# Patient Record
Sex: FEMALE | Race: WHITE
[De-identification: names, ages, dates, MRNs, and addresses within clinical notes are randomized per-mention and may not be internally consistent; named-entity substitution may affect disease eponyms.]

---

## 2020-01-03 ENCOUNTER — HOSPITAL ENCOUNTER (OUTPATIENT)
Dept: HOSPITAL 54 - DS | Age: 60
Discharge: HOME | End: 2020-01-03
Attending: SPECIALIST
Payer: COMMERCIAL

## 2020-01-03 DIAGNOSIS — K21.9: ICD-10-CM

## 2020-01-03 DIAGNOSIS — Z98.890: ICD-10-CM

## 2020-01-03 DIAGNOSIS — Z79.899: ICD-10-CM

## 2020-01-03 DIAGNOSIS — M75.41: Primary | ICD-10-CM

## 2020-01-03 DIAGNOSIS — Z90.710: ICD-10-CM

## 2020-01-03 PROCEDURE — 36415 COLL VENOUS BLD VENIPUNCTURE: CPT

## 2020-01-03 PROCEDURE — 29827 SHO ARTHRS SRG RT8TR CUF RPR: CPT

## 2020-01-03 PROCEDURE — 88311 DECALCIFY TISSUE: CPT

## 2020-01-03 PROCEDURE — A4217 STERILE WATER/SALINE, 500 ML: HCPCS

## 2020-01-03 PROCEDURE — 86850 RBC ANTIBODY SCREEN: CPT

## 2020-01-03 PROCEDURE — A4565 SLINGS: HCPCS

## 2020-01-03 PROCEDURE — 88304 TISSUE EXAM BY PATHOLOGIST: CPT

## 2020-01-03 PROCEDURE — C1713 ANCHOR/SCREW BN/BN,TIS/BN: HCPCS

## 2020-01-03 PROCEDURE — 29824 SHO ARTHRS SRG DSTL CLAVICLC: CPT

## 2020-03-12 ENCOUNTER — OFFICE (OUTPATIENT)
Dept: URBAN - METROPOLITAN AREA CLINIC 33 | Facility: CLINIC | Age: 60
End: 2020-03-12

## 2020-03-12 VITALS
WEIGHT: 170 LBS | HEIGHT: 63 IN | HEART RATE: 92 BPM | DIASTOLIC BLOOD PRESSURE: 80 MMHG | SYSTOLIC BLOOD PRESSURE: 93 MMHG

## 2020-03-12 DIAGNOSIS — R14.2 BELCHING SYMPTOM: ICD-10-CM

## 2020-03-12 PROCEDURE — 99214 OFFICE O/P EST MOD 30 MIN: CPT | Performed by: INTERNAL MEDICINE

## 2020-03-12 RX ORDER — OMEPRAZOLE 20 MG/1
40 CAPSULE, DELAYED RELEASE ORAL
Qty: 60 | Status: COMPLETED
Start: 2020-03-12 | End: 2020-06-05

## 2020-03-12 NOTE — SERVICEHPINOTES
c/o ongoing burping, tried acid blockers, felt worse, tried multiple ppi, felt worse did a breath test (neg, unclear what for) chewing gum helps digest food had shoulder surgery, had pre op exam told possible hiatal hernia feels better w/ hs omeprazole

## 2020-05-08 ENCOUNTER — OFFICE (OUTPATIENT)
Dept: URBAN - METROPOLITAN AREA CLINIC 33 | Facility: CLINIC | Age: 60
End: 2020-05-08

## 2020-05-08 VITALS
WEIGHT: 170 LBS | HEIGHT: 63 IN | TEMPERATURE: 97.9 F | HEART RATE: 101 BPM | DIASTOLIC BLOOD PRESSURE: 93 MMHG | SYSTOLIC BLOOD PRESSURE: 118 MMHG

## 2020-05-08 DIAGNOSIS — R13.10 DYSPHAGIA: ICD-10-CM

## 2020-05-08 DIAGNOSIS — R14.2 BELCHING SYMPTOM: ICD-10-CM

## 2020-05-08 PROCEDURE — 99213 OFFICE O/P EST LOW 20 MIN: CPT | Performed by: INTERNAL MEDICINE

## 2020-05-08 RX ORDER — PANTOPRAZOLE SODIUM 40 MG/1
80 TABLET, DELAYED RELEASE ORAL
Qty: 60 | Status: COMPLETED
Start: 2020-05-08 | End: 2020-05-08

## 2020-05-08 NOTE — SERVICEHPINOTES
c/o ongoing burping feels like she is choking on bid ppi    ? dysphagia  breathing ok, no cough, no fever

## 2020-05-26 VITALS
RESPIRATION RATE: 16 BRPM | HEIGHT: 63 IN | HEART RATE: 105 BPM | DIASTOLIC BLOOD PRESSURE: 93 MMHG | SYSTOLIC BLOOD PRESSURE: 128 MMHG | TEMPERATURE: 97.5 F | OXYGEN SATURATION: 97 %

## 2020-05-27 ENCOUNTER — AMBULATORY SURGICAL CENTER (OUTPATIENT)
Dept: URBAN - METROPOLITAN AREA SURGERY 26 | Facility: SURGERY | Age: 60
End: 2020-05-27

## 2020-05-27 DIAGNOSIS — R13.10 DYSPHAGIA, UNSPECIFIED: ICD-10-CM

## 2020-05-27 DIAGNOSIS — R14.2 ERUCTATION: ICD-10-CM

## 2020-05-27 LAB — SURGICAL: PDF REPORT: (no result)

## 2020-05-27 PROCEDURE — 43239 EGD BIOPSY SINGLE/MULTIPLE: CPT | Performed by: INTERNAL MEDICINE

## 2020-06-05 ENCOUNTER — OFFICE (OUTPATIENT)
Dept: URBAN - METROPOLITAN AREA CLINIC 33 | Facility: CLINIC | Age: 60
End: 2020-06-05

## 2020-06-05 VITALS
HEIGHT: 63 IN | SYSTOLIC BLOOD PRESSURE: 119 MMHG | HEART RATE: 89 BPM | DIASTOLIC BLOOD PRESSURE: 95 MMHG | WEIGHT: 170 LBS | TEMPERATURE: 97.1 F

## 2020-06-05 DIAGNOSIS — R14.2 BELCHING SYMPTOM: ICD-10-CM

## 2020-06-05 DIAGNOSIS — F41.9 ANXIETY DISORDER: ICD-10-CM

## 2020-06-05 PROCEDURE — 99213 OFFICE O/P EST LOW 20 MIN: CPT | Performed by: INTERNAL MEDICINE

## 2020-06-05 RX ORDER — PAROXETINE HYDROCHLORIDE 10 MG/1
TABLET, FILM COATED ORAL
Qty: 30 | Status: ACTIVE
Start: 2020-06-05

## 2020-06-30 ENCOUNTER — OFFICE (OUTPATIENT)
Dept: URBAN - METROPOLITAN AREA CLINIC 33 | Facility: CLINIC | Age: 60
End: 2020-06-30

## 2020-06-30 VITALS
HEIGHT: 63 IN | SYSTOLIC BLOOD PRESSURE: 107 MMHG | DIASTOLIC BLOOD PRESSURE: 84 MMHG | TEMPERATURE: 98.3 F | WEIGHT: 170 LBS | HEART RATE: 97 BPM

## 2020-06-30 DIAGNOSIS — F41.9 ANXIETY DISORDER: ICD-10-CM

## 2020-06-30 DIAGNOSIS — R14.2 BELCHING SYMPTOM: ICD-10-CM

## 2020-06-30 PROCEDURE — 99213 OFFICE O/P EST LOW 20 MIN: CPT | Performed by: INTERNAL MEDICINE

## 2025-06-23 ENCOUNTER — OFFICE (OUTPATIENT)
Dept: URBAN - METROPOLITAN AREA CLINIC 33 | Facility: CLINIC | Age: 65
End: 2025-06-23

## 2025-06-23 VITALS
WEIGHT: 184 LBS | HEART RATE: 75 BPM | TEMPERATURE: 98.6 F | HEIGHT: 63 IN | DIASTOLIC BLOOD PRESSURE: 87 MMHG | SYSTOLIC BLOOD PRESSURE: 140 MMHG

## 2025-06-23 DIAGNOSIS — K21.9 GERD: ICD-10-CM

## 2025-06-23 DIAGNOSIS — R14.2 BELCHING SYMPTOM: ICD-10-CM

## 2025-06-23 PROCEDURE — 99203 OFFICE O/P NEW LOW 30 MIN: CPT | Performed by: INTERNAL MEDICINE

## 2025-06-23 RX ORDER — OMEPRAZOLE 40 MG/1
CAPSULE, DELAYED RELEASE ORAL
Qty: 30 | Status: ACTIVE
Start: 2025-06-23

## 2025-06-23 NOTE — SERVICEHPINOTES
c/o incr burping  gerd   new rx pantop  stopped famot (made constipation )   also takes omeprazle   20 mg   not helping  swallowing   appetite good had colon exam 2-3 yrs ago, told fine not w exercise    ongoing burping needs to see cardiologist  (regular doctor evaluating)

## 2025-07-02 ENCOUNTER — AMBULATORY SURGICAL CENTER (OUTPATIENT)
Dept: URBAN - METROPOLITAN AREA SURGERY 17 | Facility: SURGERY | Age: 65
End: 2025-07-02

## 2025-07-02 VITALS
WEIGHT: 184 LBS | OXYGEN SATURATION: 98 % | SYSTOLIC BLOOD PRESSURE: 140 MMHG | RESPIRATION RATE: 16 BRPM | HEIGHT: 63 IN | HEART RATE: 97 BPM | DIASTOLIC BLOOD PRESSURE: 86 MMHG | TEMPERATURE: 98.2 F

## 2025-07-02 DIAGNOSIS — K44.9 DIAPHRAGMATIC HERNIA WITHOUT OBSTRUCTION OR GANGRENE: ICD-10-CM

## 2025-07-02 DIAGNOSIS — R14.2 ERUCTATION: ICD-10-CM

## 2025-07-02 DIAGNOSIS — K21.9 GASTRO-ESOPHAGEAL REFLUX DISEASE WITHOUT ESOPHAGITIS: ICD-10-CM

## 2025-07-02 DIAGNOSIS — K22.89 OTHER SPECIFIED DISEASE OF ESOPHAGUS: ICD-10-CM

## 2025-07-02 DIAGNOSIS — K29.70 GASTRITIS, UNSPECIFIED, WITHOUT BLEEDING: ICD-10-CM

## 2025-07-02 PROCEDURE — 43239 EGD BIOPSY SINGLE/MULTIPLE: CPT | Performed by: SPECIALIST
